# Patient Record
Sex: FEMALE | Race: WHITE | URBAN - METROPOLITAN AREA
[De-identification: names, ages, dates, MRNs, and addresses within clinical notes are randomized per-mention and may not be internally consistent; named-entity substitution may affect disease eponyms.]

---

## 2017-08-11 ENCOUNTER — TELEPHONE (OUTPATIENT)
Dept: SURGERY | Facility: CLINIC | Age: 52
End: 2017-08-11

## 2017-08-11 DIAGNOSIS — R19.7 DIARRHEA FOLLOWING GASTROINTESTINAL SURGERY: Primary | ICD-10-CM

## 2017-08-11 DIAGNOSIS — Z98.890 DIARRHEA FOLLOWING GASTROINTESTINAL SURGERY: Primary | ICD-10-CM

## 2017-08-11 RX ORDER — CHOLESTYRAMINE LIGHT 4 G/5.7G
4 POWDER, FOR SUSPENSION ORAL 2 TIMES DAILY
Qty: 42 PACKET | Refills: 1 | Status: SHIPPED | OUTPATIENT
Start: 2017-08-11 | End: 2017-09-21

## 2017-08-11 NOTE — TELEPHONE ENCOUNTER
Spoke with pt.  She has been having off and on diarrhea since her surgery.  Citlali discussed Questran with her at her post op visit.  She was never prescribed this, but would like to try it now.      She denies any other symptoms of infectious source.  Nobody else in house with similar symptoms.  Comes and goes.  No blood in stool.  She has a FHx of colon cancer and is due this fall for another colonoscopy (done every 5 yrs).    We discussed Questran may or may not work.  She would like to try it.  If no results, would ask that she follow up with her PCP or GI for chronic issues.    Prescription sent to [facility] in [place].

## 2017-08-11 NOTE — TELEPHONE ENCOUNTER
Name of caller: Patient    Reason for Call:  Patient is having ongoing digestive issues mainly diarrhea. She would like the rx that Dr. Segal prescribed last year for her.    Surgeon:  Dr. Segal    Recent Surgery:  Yes.    If yes, when & what type:  Laparoscopic Cholecystectomy 11/21/16      Best phone number to reach pt at is: [phone]  Ok to leave a message with medical info? No    Pharmacy preferred (if calling for a refill): N/A

## 2017-09-01 ENCOUNTER — TRANSFERRED RECORDS (OUTPATIENT)
Dept: HEALTH INFORMATION MANAGEMENT | Facility: CLINIC | Age: 52
End: 2017-09-01

## 2017-09-01 LAB — PAP SMEAR - HIM PATIENT REPORTED: NEGATIVE

## 2017-09-21 DIAGNOSIS — R19.7 DIARRHEA FOLLOWING GASTROINTESTINAL SURGERY: ICD-10-CM

## 2017-09-21 DIAGNOSIS — Z98.890 DIARRHEA FOLLOWING GASTROINTESTINAL SURGERY: ICD-10-CM

## 2017-10-04 RX ORDER — CHOLESTYRAMINE 4 G/5.5G
POWDER, FOR SUSPENSION ORAL
Qty: 42 PACKET | Refills: 1 | Status: SHIPPED | OUTPATIENT
Start: 2017-10-04 | End: 2017-10-20

## 2017-10-20 ENCOUNTER — OFFICE VISIT (OUTPATIENT)
Dept: FAMILY MEDICINE | Facility: CLINIC | Age: 52
End: 2017-10-20
Payer: COMMERCIAL

## 2017-10-20 VITALS
HEIGHT: 70 IN | DIASTOLIC BLOOD PRESSURE: 80 MMHG | WEIGHT: 248 LBS | RESPIRATION RATE: 18 BRPM | BODY MASS INDEX: 35.5 KG/M2 | HEART RATE: 76 BPM | OXYGEN SATURATION: 98 % | TEMPERATURE: 96.2 F | SYSTOLIC BLOOD PRESSURE: 115 MMHG

## 2017-10-20 DIAGNOSIS — K52.9 CHRONIC DIARRHEA: ICD-10-CM

## 2017-10-20 DIAGNOSIS — R19.7 DIARRHEA FOLLOWING GASTROINTESTINAL SURGERY: ICD-10-CM

## 2017-10-20 DIAGNOSIS — Z00.00 ROUTINE GENERAL MEDICAL EXAMINATION AT A HEALTH CARE FACILITY: Primary | ICD-10-CM

## 2017-10-20 DIAGNOSIS — Z98.890 DIARRHEA FOLLOWING GASTROINTESTINAL SURGERY: ICD-10-CM

## 2017-10-20 PROBLEM — Z80.0 FAMILY HISTORY OF COLON CANCER: Status: ACTIVE | Noted: 2017-10-20

## 2017-10-20 PROBLEM — E28.319 EARLY MENOPAUSE: Status: ACTIVE | Noted: 2017-10-20

## 2017-10-20 LAB
CHOLEST SERPL-MCNC: 173 MG/DL
GLUCOSE SERPL-MCNC: 86 MG/DL (ref 70–99)
HDLC SERPL-MCNC: 56 MG/DL
LDLC SERPL CALC-MCNC: 75 MG/DL
NONHDLC SERPL-MCNC: 117 MG/DL
T3FREE SERPL-MCNC: 2.8 PG/ML (ref 2.3–4.2)
T4 FREE SERPL-MCNC: 1.21 NG/DL (ref 0.76–1.46)
TRIGL SERPL-MCNC: 208 MG/DL
TSH SERPL DL<=0.005 MIU/L-ACNC: 1.41 MU/L (ref 0.4–4)

## 2017-10-20 PROCEDURE — 84481 FREE ASSAY (FT-3): CPT | Performed by: FAMILY MEDICINE

## 2017-10-20 PROCEDURE — 82947 ASSAY GLUCOSE BLOOD QUANT: CPT | Performed by: FAMILY MEDICINE

## 2017-10-20 PROCEDURE — 84439 ASSAY OF FREE THYROXINE: CPT | Performed by: FAMILY MEDICINE

## 2017-10-20 PROCEDURE — 36415 COLL VENOUS BLD VENIPUNCTURE: CPT | Performed by: FAMILY MEDICINE

## 2017-10-20 PROCEDURE — 84443 ASSAY THYROID STIM HORMONE: CPT | Performed by: FAMILY MEDICINE

## 2017-10-20 PROCEDURE — 99396 PREV VISIT EST AGE 40-64: CPT | Performed by: FAMILY MEDICINE

## 2017-10-20 PROCEDURE — 80061 LIPID PANEL: CPT | Performed by: FAMILY MEDICINE

## 2017-10-20 RX ORDER — CHOLESTYRAMINE LIGHT 4 G/5.7G
POWDER, FOR SUSPENSION ORAL
Qty: 42 PACKET | Refills: 11 | Status: SHIPPED | OUTPATIENT
Start: 2017-10-20

## 2017-10-20 NOTE — Clinical Note
Please abstract the following data from this visit with this patient into the appropriate field in Epic:  Pap smear done on this date: 9/2017 (approximately), by this group: [facility], results were normal.

## 2017-10-20 NOTE — LETTER
"October 24, 2017      Dirk Roberts  [address] [address]        Dear ,    We are writing to inform you of your test results.    Here are the results of your recent blood tests.  Your cholesterol overall is excellent, with a low LDL (the \"bad\" cholesterol) and a very nice HDL (\"good\" cholesterol).  The triglycerides, a different type of fat in the blood which fluctuate easily, were mildly elevated.  I would just suggest rechecking your fasting labs in one year.  Your blood sugar was normal, no diabetes or pre-diabetes.  Your thyroid tests are normal.      Please let me know if you have any questions.      Resulted Orders   Lipid Profile   Result Value Ref Range    Cholesterol 173 <200 mg/dL    Triglycerides 208 (H) <150 mg/dL      Comment:      Borderline high:  150-199 mg/dl  High:             200-499 mg/dl  Very high:       >499 mg/dl  Fasting specimen      HDL Cholesterol 56 >49 mg/dL    LDL Cholesterol Calculated 75 <100 mg/dL      Comment:      Desirable:       <100 mg/dl    Non HDL Cholesterol 117 <130 mg/dL   Glucose   Result Value Ref Range    Glucose 86 70 - 99 mg/dL      Comment:      Fasting specimen   TSH   Result Value Ref Range    TSH 1.41 0.40 - 4.00 mU/L   T4, free   Result Value Ref Range    T4 Free 1.21 0.76 - 1.46 ng/dL   T3, Free   Result Value Ref Range    Free T3 2.8 2.3 - 4.2 pg/mL       If you have any questions or concerns, please call the clinic at the number listed above.       Sincerely,        Joceline Irene MD/nr                "

## 2017-10-20 NOTE — PROGRESS NOTES
SUBJECTIVE:   CC: Dirk Roberts is an 51 year old woman who presents for preventive health visit.     Physical   Annual:     Getting at least 3 servings of Calcium per day::  Yes    Bi-annual eye exam::  NO    Dental care twice a year::  Yes    Sleep apnea or symptoms of sleep apnea::  Excessive snoring    Diet::  Gluten-free/reduced    Frequency of exercise::  2-3 days/week    Duration of exercise::  30-45 minutes    Taking medications regularly::  Yes    Medication side effects::  None    Additional concerns today::  YES (colonoscopy referral and GI)     Dirk is a 50 yo healthy woman, new to our clinic, presenting for a physical.  She moved here three years ago from [facility].  She has twins who are 11 years old.  She is a [employer] .    CV: no family h/o early CAD, due for fasting labs. She has a distant smoking history, <30pk yrs.  Malignancy: father  at 65 of colon cancer, her maternal uncle  at 55 of colon cancer, and her paternal uncle had esophageal cancer at age 30 (is living).  No breast cancer in the family, has had normal mammo in the past.  Paps normal in the past, and just had pap done this year with her OB/GYN.  She would like a colonoscopy for cancer screening.  She also notes a h/o GERD and upper endoscopy which found possible sprue.  She has had chronic diarrhea for the past one year since her cholecystectomy; initially though it was due to her surgery, and takes cholestyramine, but now wondering it is something else.  She did have joint pains previously, but these improved after her GB surgery.  She does avoid gluten in her diet.    Bone health: early menopause at [age], mother with osteoporosis, has not had bone scan  Immunizations; tdap up to date, she had a flu shot at work.    Sexual health: no concerns, postmenopausal.   Depression screen :neg.  Moved here 3 years ago. [place].    Distant smoking hx, not much    Today's PHQ-2 Score:   PHQ-2 (  [employer])  10/20/2017   Q1: Little interest or pleasure in doing things 0   Q2: Feeling down, depressed or hopeless 0   PHQ-2 Score 0   Q1: Little interest or pleasure in doing things Not at all   Q2: Feeling down, depressed or hopeless Not at all   PHQ-2 Score 0     Abuse: Current or Past(Physical, Sexual or Emotional)- No  Do you feel safe in your environment - Yes    Social History   Substance Use Topics     Smoking status: Former Smoker     Smokeless tobacco: Never Used     Alcohol use No     The patient does not drink >3 drinks per day nor >7 drinks per week.    Reviewed orders with patient.  Reviewed health maintenance and updated orders accordingly - Yes  Patient Active Problem List   Diagnosis     Lumbago     Cervicalgia     Early menopause     Family history of colon cancer     Past Surgical History:   Procedure Laterality Date     BACK SURGERY      cervical fusion      SECTION       LAPAROSCOPIC CHOLECYSTECTOMY N/A 2016    Procedure: LAPAROSCOPIC CHOLECYSTECTOMY;  Surgeon: Anirudh Segal MD;  Location[facility]       Social History   Substance Use Topics     Smoking status: Former Smoker     Smokeless tobacco: Never Used     Alcohol use No     Family History   Problem Relation Age of Onset     Colon Cancer Father      d 65     Colon Cancer Maternal Uncle      d 55     Esophageal Cancer Paternal Uncle 30     OSTEOPOROSIS Mother          Current Outpatient Prescriptions   Medication Sig Dispense Refill     cholestyramine light (PREVALITE) 4 GM Packet TAKE 1 PACKET (4 G) BY MOUTH 2 TIMES DAILY 42 packet 11     [DISCONTINUED] PREVALITE 4 G Packet TAKE 1 PACKET (4 G) BY MOUTH 2 TIMES DAILY 42 packet 1     Ondansetron HCl (ZOFRAN PO) Take 4 mg by mouth every 8 hours as needed for nausea or vomiting       HYDROcodone-acetaminophen (NORCO) 5-325 MG per tablet Take 1-2 tablets by mouth every 4 hours as needed for other (Moderate to Severe Pain) (Patient not taking: Reported on 10/20/2017) 30 tablet 0      "Ibuprofen (ADVIL PO)        Allergies   Allergen Reactions     Gluten Meal          Patient over age 50, mutual decision to screen reflected in health maintenance.      Pertinent mammograms are reviewed under the imaging tab.  History of abnormal Pap smear: NO - age 30- 65 PAP every 3 years recommended  NO - age 30-65 PAP every 5 years with negative HPV co-testing recommended    Reviewed and updated as needed this visit by clinical staffTobacco  Allergies  Meds  Med Hx  Surg Hx  Fam Hx  Soc Hx        Reviewed and updated as needed this visit by Provider            Review of Systems  C: NEGATIVE for fever, chills, change in weight  I: NEGATIVE for worrisome rashes, moles or lesions  E: NEGATIVE for vision changes or irritation  ENT: NEGATIVE for ear, mouth and throat problems  R: NEGATIVE for significant cough or SOB  B: NEGATIVE for masses, tenderness or discharge  CV: NEGATIVE for chest pain, palpitations or peripheral edema  GI: NEGATIVE for nausea, abdominal pain, heartburn, or change in bowel habits  : NEGATIVE for unusual urinary or vaginal symptoms. No vaginal bleeding.  M: NEGATIVE for significant arthralgias or myalgia  N: NEGATIVE for weakness, dizziness or paresthesias  P: NEGATIVE for changes in mood or affect      OBJECTIVE:   /80 (BP Location: Right arm, Patient Position: Sitting, Cuff Size: Adult Large)  Pulse 76  Temp 96.2  F (35.7  C) (Tympanic)  Resp 18  Ht 5' 10\" (1.778 m)  Wt 248 lb (112.5 kg)  SpO2 98%  BMI 35.58 kg/m2  Physical Exam  GENERAL: healthy, alert and no distress  EYES: Eyes grossly normal to inspection, PERRL and conjunctivae and sclerae normal  HENT: ear canals and TM's normal, nose and mouth without ulcers or lesions  NECK: no adenopathy, no asymmetry, masses, or scars and thyroid normal to palpation  RESP: lungs clear to auscultation - no rales, rhonchi or wheezes  BREAST: normal without masses, tenderness or nipple discharge and no palpable axillary masses or " "adenopathy  CV: regular rate and rhythm, normal S1 S2, no S3 or S4, no murmur, click or rub, no peripheral edema and peripheral pulses strong  ABDOMEN: soft, nontender, no hepatosplenomegaly, no masses and bowel sounds normal  MS: no gross musculoskeletal defects noted, no edema  SKIN: no suspicious lesions or rashes  NEURO: Normal strength and tone, mentation intact and speech normal  PSYCH: mentation appears normal, affect normal/bright    ASSESSMENT/PLAN:   1. Routine general medical examination at a health care facility  CV: fasting labs today.  She is quite active.   Malignancy: due for colon cancer screening, but I'm having her see GI first to consult on the chronic diarrhea and possible history of sprue as well.  Mammogram this year.  I am not sure if she had pap with HPV this year or just pap, but would be due in 3-5 years depending .  Bone health: consider dexa earlier than 65 given early menopause and family hx    - Lipid Profile  - Glucose  - GASTROENTEROLOGY ADULT REF CONSULT ONLY    2. Diarrhea following gastrointestinal surgery    - cholestyramine light (PREVALITE) 4 GM Packet; TAKE 1 PACKET (4 G) BY MOUTH 2 TIMES DAILY  Dispense: 42 packet; Refill: 11      3. Chronic diarrhea  ?possible sprue  - TSH  - T4, free  - T3, Free  - GASTROENTEROLOGY ADULT REF CONSULT ONLY    COUNSELING:  Reviewed preventive health counseling, as reflected in patient instructions         reports that she has quit smoking. She has never used smokeless tobacco.    Estimated body mass index is 35.58 kg/(m^2) as calculated from the following:    Height as of this encounter: 5' 10\" (1.778 m).    Weight as of this encounter: 248 lb (112.5 kg).       Counseling Resources:  ATP IV Guidelines  Pooled Cohorts Equation Calculator  Breast Cancer Risk Calculator  FRAX Risk Assessment  ICSI Preventive Guidelines  Dietary Guidelines for Americans, 2010  USDA's MyPlate  ASA Prophylaxis  Lung CA Screening    Joceline Irene MD  [place] " [facility]  Answers for HPI/ROS submitted by the patient on 10/20/2017   PHQ-2 Score: 0

## 2017-10-20 NOTE — MR AVS SNAPSHOT
After Visit Summary   10/20/2017    Dirk Roberts    MRN: [id number]           Patient Information     Date Of Birth          1965        Visit Information        Provider Department      10/20/2017 9:00 AM Joceline Irene MD [facility]        Today's Diagnoses     Routine general medical examination at a health care facility    -  1    Diarrhea following gastrointestinal surgery        Chronic diarrhea          Care Instructions      Preventive Health Recommendations  Female Ages 50 - 64    Yearly exam: See your health care provider every year in order to  o Review health changes.   o Discuss preventive care.    o Review your medicines if your doctor has prescribed any.      Get a Pap test every three years (unless you have an abnormal result and your provider advises testing more often).    If you get Pap tests with HPV test, you only need to test every 5 years, unless you have an abnormal result.     You do not need a Pap test if your uterus was removed (hysterectomy) and you have not had cancer.    You should be tested each year for STDs (sexually transmitted diseases) if you're at risk.     Have a mammogram every 1 to 2 years.    Have a colonoscopy at age 50, or have a yearly FIT test (stool test). These exams screen for colon cancer.      Have a cholesterol test every 5 years, or more often if advised.    Have a diabetes test (fasting glucose) every three years. If you are at risk for diabetes, you should have this test more often.     If you are at risk for osteoporosis (brittle bone disease), think about having a bone density scan (DEXA).    Shots: Get a flu shot each year. Get a tetanus shot every 10 years.    Nutrition:     Eat at least 5 servings of fruits and vegetables each day.    Eat whole-grain bread, whole-wheat pasta and brown rice instead of white grains and rice.    Talk to your provider about Calcium and Vitamin D.     Lifestyle    Exercise at  least 150 minutes a week (30 minutes a day, 5 days a week). This will help you control your weight and prevent disease.    Limit alcohol to one drink per day.    No smoking.     Wear sunscreen to prevent skin cancer.     See your dentist every six months for an exam and cleaning.    See your eye doctor every 1 to 2 years.            Follow-ups after your visit        Additional Services     GASTROENTEROLOGY ADULT REF CONSULT ONLY       Preferred Location: [facility] [phone]      Please be aware that coverage of these services is subject to the terms and limitations of your health insurance plan.  Call member services at your health plan with any benefit or coverage questions.  Any procedures must be performed at a [place] facility OR coordinated by your clinic's referral office.    Please bring the following with you to your appointment:    (1) Any X-Rays, CTs or MRIs which have been performed.  Contact the facility where they were done to arrange for  prior to your scheduled appointment.    (2) List of current medications   (3) This referral request   (4) Any documents/labs given to you for this referral                  Who to contact     If you have questions or need follow up information about today's clinic visit or your schedule please contact [facility] directly at [phone].  Normal or non-critical lab and imaging results will be communicated to you by MyChart, letter or phone within 4 business days after the clinic has received the results. If you do not hear from us within 7 days, please contact the clinic through MyChart or phone. If you have a critical or abnormal lab result, we will notify you by phone as soon as possible.  Submit refill requests through [employer] or call your pharmacy and they will forward the refill request to us. Please allow 3 business days for your refill to be completed.          Additional Information About Your Visit        [facility]  "Information     [employer] lets you send messages to your doctor, view your test results, renew your prescriptions, schedule appointments and more. To sign up, go to [email or web address] . Click on \"Log in\" on the left side of the screen, which will take you to the Welcome page. Then click on \"Sign up Now\" on the right side of the page.     You will be asked to enter the access code listed below, as well as some personal information. Please follow the directions to create your username and password.     Your access code is: [email or web address]  Expires: 2018  8:52 AM     Your access code will  in 90 days. If you need help or a new code, please call your [place] clinic or [phone].        Care EveryWhere ID     This is your Care EveryWhere ID. This could be used by other organizations to access your [place] medical records  [id number]        Your Vitals Were     Pulse Temperature Respirations Height Pulse Oximetry BMI (Body Mass Index)    76 96.2  F (35.7  C) (Tympanic) 18 5' 10\" (1.778 m) 98% 35.58 kg/m2       Blood Pressure from Last 3 Encounters:   10/20/17 115/80   16 140/80   11/15/16 140/78    Weight from Last 3 Encounters:   10/20/17 248 lb (112.5 kg)   16 250 lb 6.4 oz (113.6 kg)   11/15/16 250 lb (113.4 kg)              We Performed the Following     GASTROENTEROLOGY ADULT REF CONSULT ONLY     Glucose     Lipid Profile     T3, Free     T4, free     TSH          Today's Medication Changes          These changes are accurate as of: 10/20/17  9:41 AM.  If you have any questions, ask your nurse or doctor.               These medicines have changed or have updated prescriptions.        Dose/Directions    cholestyramine light 4 GM Packet   Commonly known as:  PREVALITE   This may have changed:  See the new instructions.   Used for:  Diarrhea following gastrointestinal surgery   Changed by:  Joceline Irene MD        TAKE 1 PACKET (4 G) BY MOUTH 2 TIMES DAILY   Quantity:  42 packet "   Refills:  11            Where to get your medicines      These medications were sent to [id number] - [place], [place] - [address]  [address], [facility] [address]     Phone:  [phone]     cholestyramine light 4 GM Packet                Primary Care Provider Office Phone # Fax #    Joceline Irene [phone] [address] [place] [address]        Equal Access to Services     [facility] : Hadii aad ku hadasho Soomaali, waaxda luqadaha, qaybta kaalmada adeegyada, waxay idiin hayaan adeeg kharash lalionel . So [facility] [phone].    ATENCIÓN: Si habla español, tiene a singleton disposición servicios gratuitos de asistencia lingüística. [facility] [phone].    We comply with applicable federal civil rights laws and [place] laws. We do not discriminate on the basis of race, color, national origin, age, disability, sex, sexual orientation, or gender identity.            Thank you!     Thank you for choosing [facility]  for your care. Our goal is always to provide you with excellent care. Hearing back from our patients is one way we can continue to improve our services. Please take a few minutes to complete the written survey that you may receive in the mail after your visit with us. Thank you!             Your Updated Medication List - Protect others around you: Learn how to safely use, store and throw away your medicines at www.disposemymeds.org.          This list is accurate as of: 10/20/17  9:41 AM.  Always use your most recent med list.                   Brand Name Dispense Instructions for use Diagnosis    ADVIL PO           cholestyramine light 4 GM Packet    PREVALITE    42 packet    TAKE 1 PACKET (4 G) BY MOUTH 2 TIMES DAILY    Diarrhea following gastrointestinal surgery       HYDROcodone-acetaminophen 5-325 MG per tablet    NORCO    30 tablet    Take 1-2 tablets by mouth every 4 hours as needed for other (Moderate to Severe Pain)     Acute post-operative pain       ZOFRAN PO      Take 4 mg by mouth every 8 hours as needed for nausea or vomiting

## 2017-10-20 NOTE — Clinical Note
Patient reported normal pap September 2017 at [facility] (?can we get records so I know if she should be q 3 or q 5 years? Thanks)

## 2017-10-21 ENCOUNTER — HEALTH MAINTENANCE LETTER (OUTPATIENT)
Age: 52
End: 2017-10-21

## 2017-11-02 ENCOUNTER — TRANSFERRED RECORDS (OUTPATIENT)
Dept: HEALTH INFORMATION MANAGEMENT | Facility: CLINIC | Age: 52
End: 2017-11-02

## 2018-03-28 ENCOUNTER — TELEPHONE (OUTPATIENT)
Dept: FAMILY MEDICINE | Facility: CLINIC | Age: 53
End: 2018-03-28

## 2020-01-30 ENCOUNTER — TRANSCRIBE ORDERS (OUTPATIENT)
Dept: OTHER | Age: 55
End: 2020-01-30

## 2020-01-30 DIAGNOSIS — R21 RASH AND OTHER NONSPECIFIC SKIN ERUPTION: Primary | ICD-10-CM

## 2020-01-31 ENCOUNTER — TELEPHONE (OUTPATIENT)
Dept: ALLERGY | Facility: CLINIC | Age: 55
End: 2020-01-31

## 2020-01-31 NOTE — LETTER
February 17, 2020      Dirk Roberts  [address] [address]              Arnoldo Roberts ,     I am reaching out to you to inform you that your provider, Dr. Patricia, has referred you to our allergist  or . To schedule a consultation please call [phone].     Thank you,  [facility]

## 2021-05-25 ENCOUNTER — RECORDS - HEALTHEAST (OUTPATIENT)
Dept: ADMINISTRATIVE | Facility: CLINIC | Age: 56
End: 2021-05-25

## 2021-05-26 ENCOUNTER — RECORDS - HEALTHEAST (OUTPATIENT)
Dept: ADMINISTRATIVE | Facility: CLINIC | Age: 56
End: 2021-05-26

## 2021-05-27 ENCOUNTER — RECORDS - HEALTHEAST (OUTPATIENT)
Dept: ADMINISTRATIVE | Facility: CLINIC | Age: 56
End: 2021-05-27